# Patient Record
Sex: FEMALE | Race: WHITE | NOT HISPANIC OR LATINO | Employment: UNEMPLOYED | URBAN - NONMETROPOLITAN AREA
[De-identification: names, ages, dates, MRNs, and addresses within clinical notes are randomized per-mention and may not be internally consistent; named-entity substitution may affect disease eponyms.]

---

## 2018-07-02 ENCOUNTER — OFFICE VISIT (OUTPATIENT)
Dept: PULMONOLOGY | Facility: CLINIC | Age: 38
End: 2018-07-02

## 2018-07-02 VITALS
RESPIRATION RATE: 17 BRPM | BODY MASS INDEX: 32.15 KG/M2 | HEIGHT: 65 IN | DIASTOLIC BLOOD PRESSURE: 80 MMHG | WEIGHT: 193 LBS | HEART RATE: 90 BPM | OXYGEN SATURATION: 96 % | SYSTOLIC BLOOD PRESSURE: 120 MMHG

## 2018-07-02 DIAGNOSIS — J30.89 OTHER ALLERGIC RHINITIS: ICD-10-CM

## 2018-07-02 DIAGNOSIS — R06.02 SHORTNESS OF BREATH: Primary | ICD-10-CM

## 2018-07-02 DIAGNOSIS — J30.2 CHRONIC SEASONAL ALLERGIC RHINITIS, UNSPECIFIED TRIGGER: ICD-10-CM

## 2018-07-02 DIAGNOSIS — R06.02 SOB (SHORTNESS OF BREATH): Primary | ICD-10-CM

## 2018-07-02 DIAGNOSIS — R91.1 LUNG NODULE, SOLITARY: ICD-10-CM

## 2018-07-02 DIAGNOSIS — J45.40 MODERATE PERSISTENT ASTHMA WITHOUT COMPLICATION: ICD-10-CM

## 2018-07-02 PROCEDURE — 94060 EVALUATION OF WHEEZING: CPT | Performed by: INTERNAL MEDICINE

## 2018-07-02 PROCEDURE — 99244 OFF/OP CNSLTJ NEW/EST MOD 40: CPT | Performed by: INTERNAL MEDICINE

## 2018-07-02 PROCEDURE — 95012 NITRIC OXIDE EXP GAS DETER: CPT | Performed by: INTERNAL MEDICINE

## 2018-07-02 RX ORDER — MONTELUKAST SODIUM 10 MG/1
10 TABLET ORAL NIGHTLY
Qty: 30 TABLET | Refills: 5 | Status: SHIPPED | OUTPATIENT
Start: 2018-07-02 | End: 2019-09-09

## 2018-07-02 RX ORDER — ALBUTEROL SULFATE 2.5 MG/3ML
2.5 SOLUTION RESPIRATORY (INHALATION)
COMMUNITY
Start: 2018-05-28 | End: 2019-05-08

## 2018-07-02 RX ORDER — CETIRIZINE HYDROCHLORIDE 10 MG/1
10 TABLET ORAL DAILY
COMMUNITY

## 2018-07-02 RX ORDER — FLUTICASONE PROPIONATE 50 MCG
1 SPRAY, SUSPENSION (ML) NASAL DAILY
Qty: 1 BOTTLE | Refills: 5 | Status: SHIPPED | OUTPATIENT
Start: 2018-07-02 | End: 2020-11-19

## 2018-07-02 RX ORDER — ALBUTEROL SULFATE 90 UG/1
AEROSOL, METERED RESPIRATORY (INHALATION)
COMMUNITY
Start: 2018-05-28 | End: 2019-05-08

## 2018-07-02 NOTE — PROGRESS NOTES
CONSULT NOTE    Requested by:   Ryan Chaudhary DO Charles P. Shaw, DO      Chief Complaint   Patient presents with   • Consult     Abnormal CT    • Shortness of Breath       Subjective   Terra Hall is a 37 y.o. female.     History of Present Illness   Patient comes in today for consultation because of shortness of breath for the past few years.  She was also found to have a lung nodule on abdominal CT.    The patient says that her shortness of breath is more pronounced during her episodes with bronchitis.  She actually has had multiple admissions to the hospital for asthma dating back to 2015.  She moved to a new house in 2015 which did not have any significant carpet in it and her symptoms improved significantly.  Late last year however, she moved to another house where there is a lot of carpet and she has had 3 exacerbations and then requiring steroid administration.    The patient denies a history of smoking.    Patient also complains of runny nose and dribbling in the back of the throat for the past few months. This has been sometimes associated with seasonal variation.    She was given Qvar but she only uses once or twice a week.  She also uses albuterol once or twice a week.    She is complaining of occasional snoring and daytime sleepiness and tiredness.    The following portions of the patient's history were reviewed and updated as appropriate: allergies, current medications, past family history, past medical history, past social history and past surgical history.    Review of Systems   Constitutional: Positive for fatigue. Negative for chills and fever.   HENT: Positive for rhinorrhea and voice change. Negative for sinus pressure, sneezing and sore throat.    Respiratory: Positive for cough and shortness of breath. Negative for chest tightness and wheezing.    Cardiovascular: Negative for chest pain, palpitations and leg swelling.   Psychiatric/Behavioral: Negative for sleep disturbance.   All  "other systems reviewed and are negative.      Past Medical History:   Diagnosis Date   • Asthma    • Kidney stones    • Migraine    • Seasonal allergies        Social History   Substance Use Topics   • Smoking status: Never Smoker   • Smokeless tobacco: Never Used   • Alcohol use No         Objective   Visit Vitals  /80   Pulse 90   Resp 17   Ht 165.1 cm (65\")   Wt 87.5 kg (193 lb)   SpO2 96%   BMI 32.12 kg/m²       Physical Exam   Constitutional: She is oriented to person, place, and time. She appears well-developed and well-nourished.   HENT:   Head: Atraumatic.   Sinuses were non tender on palpation.  Nostril showed mild erythema.  Oropharynx was cobblestoned & moist.  Nasal turbinate hypertrophy noted.    Eyes: EOM are normal.   Neck: Neck supple. No JVD present. No thyromegaly present.   Cardiovascular: Normal rate and regular rhythm.    No murmur heard.  Pulmonary/Chest: Effort normal. No respiratory distress. She has no wheezes. She has no rales.   Musculoskeletal:   Gait was normal.   Neurological: She is alert and oriented to person, place, and time.   Skin: Skin is warm and dry.   Psychiatric: She has a normal mood and affect. Her behavior is normal.   Vitals reviewed.      Assessment/Plan   Terra was seen today for consult and shortness of breath.    Diagnoses and all orders for this visit:    Shortness of breath  -     Pulmonary Function Test  -     Peak Flow  -     Nitric Oxide    Moderate persistent asthma without complication  -     Pulmonary Function Test  -     Peak Flow  -     Nitric Oxide    Lung nodule, solitary    Other allergic rhinitis  -     IgE; Future  -     Allergens, Zone 8; Future    Chronic seasonal allergic rhinitis, unspecified trigger  -     fluticasone (FLONASE) 50 MCG/ACT nasal spray; 1 spray into each nostril Daily.    Other orders  -     montelukast (SINGULAIR) 10 MG tablet; Take 1 tablet by mouth Every Night.         Return in about 3 months (around 10/2/2018) for " Sabrina, For Misty.    DISCUSSION(if any):  I have also reviewed her urgent care office note that mentions acute pharyngitis, seasonal allergic rhinitis and administration of Solu-Medrol and azithromycin.     FeNO level was 13 today.    Spirometry was reviewed with the patient.  Essentially unremarkable although there was mild obstructive defect noted.    Her peak flow was 350 LPM.    ===========================  ===========================    I had a long conversation with the patient and told her that she seems to have mild to moderate persistent asthma, with seasonal worsening.    I have added Singulair.    If the patient has any further exacerbations, and she will definitely need long-term medications such as inhaled corticosteroids.    We will try to avoid long-acting beta agonist, given her history of palpitations with Symbicort in the recent past.    Patient will be started on nasal spray for symptoms which are definitely consistent with allergic rhinitis.     I have ordered IgE/RAST panel.    Although she does not have classic symptoms of sleep apnea, I have asked her to obtain more history about her snoring from her .  She may benefit from at least filling out a sleep questionnaire, at a later date and this can be discussed with her on her follow-up.    As far as the lung nodule is concerned, her repeat CT scan showed that the nodule has remained 4 mm and there was no further follow-up recommended, which I agree with, based on the latest guidelines.    Dictated utilizing Dragon dictation.    This document was electronically signed by Esperanza Palumbo MD July 2, 2018  11:33 AM

## 2018-08-22 ENCOUNTER — OFFICE VISIT (OUTPATIENT)
Dept: OBSTETRICS AND GYNECOLOGY | Facility: CLINIC | Age: 38
End: 2018-08-22

## 2018-08-22 VITALS
WEIGHT: 195 LBS | DIASTOLIC BLOOD PRESSURE: 88 MMHG | HEIGHT: 66 IN | SYSTOLIC BLOOD PRESSURE: 154 MMHG | BODY MASS INDEX: 31.34 KG/M2

## 2018-08-22 DIAGNOSIS — Z30.09 GENERAL COUNSELING AND ADVICE ON FEMALE CONTRACEPTION: ICD-10-CM

## 2018-08-22 DIAGNOSIS — N93.9 ABNORMAL UTERINE BLEEDING (AUB): ICD-10-CM

## 2018-08-22 DIAGNOSIS — Z97.5 CONTRACEPTION, DEVICE INTRAUTERINE: ICD-10-CM

## 2018-08-22 DIAGNOSIS — R10.2 PELVIC PAIN: Primary | ICD-10-CM

## 2018-08-22 DIAGNOSIS — N20.0 NEPHROLITHIASIS: ICD-10-CM

## 2018-08-22 PROCEDURE — 99204 OFFICE O/P NEW MOD 45 MIN: CPT | Performed by: OBSTETRICS & GYNECOLOGY

## 2018-08-22 RX ORDER — CEFDINIR 300 MG/1
CAPSULE ORAL
COMMUNITY
Start: 2018-08-20 | End: 2019-07-03

## 2018-08-22 RX ORDER — PROMETHAZINE HYDROCHLORIDE 25 MG/1
TABLET ORAL
COMMUNITY
Start: 2018-08-20 | End: 2020-11-19

## 2018-08-22 RX ORDER — TAMSULOSIN HYDROCHLORIDE 0.4 MG/1
CAPSULE ORAL
COMMUNITY
Start: 2018-08-20

## 2018-08-22 RX ORDER — HYDROCODONE BITARTRATE AND ACETAMINOPHEN 10; 325 MG/1; MG/1
TABLET ORAL
COMMUNITY
Start: 2018-08-20 | End: 2020-11-19

## 2018-08-22 RX ORDER — ONDANSETRON HYDROCHLORIDE 8 MG/1
TABLET, FILM COATED ORAL
COMMUNITY
Start: 2018-08-20

## 2018-08-22 NOTE — PROGRESS NOTES
"Subjective   Chief Complaint   Patient presents with   • Pelvic Pain     started having pain about a month ago, has been passing kidney stones, has Mirena but due to be removed next month, wants to discuss other birth control options     Terra Hall is a 37 y.o. year old  presenting to be seen for pelvic pain, concerns about IUD positioning and contraception counseling..    She reports long-standing issues with nephrolithiasis.  She was seen at a [facility] on  with worsening suprapubic, pelvic and flank pain.  Pain was worse on her left side radiated to her growing.  She was diagnosed there with a UTI and kidney stones on imaging.  She reports having passed a pea-sized stone since that time and her pain is improved somewhat, but is still present.  She saw her urologist following that visit.  He prescribed Flomax, antibiotics, analgesics and antiemetics.  She has had some improvement with this therapy.    She reports that her Mirena IUD was placed 5 years ago.  She denies issues with the device for the first 4 years.  This past year she reports that she has been concerned about its placement.  She can \"feel it at times\" and has heard about these devices migrating.  She is concerned that a malpositioned IUD could be contributory to her pelvic pain.  She was told in the emergency room that it was possible it had migrated.    She also reports an issue with ovarian cysts and says this was a factor in modal therapy over the years.  She wonders today if possibly ovarian pathology could be contributing to her pelvic pain.    She is entertaining the idea of having the device removed and undergoing tubal sterilization.  She would like to discuss this as an option for her moving forward.    She has a long-standing history of migraines with neurologic symptoms resembling seizure-like and/or stroke-like activity.  She thinks the IUD has helped with these as they have been much less frequent during " use.      She reports a long-standing history with abnormal uterine bleeding.  She reports heavy and painful menses.  She has used a variety of hormonal medication through the years.  She reports using oral birth control pills since the age of 14.  Her headaches prompted her doctors to switch to progesterone only therapies.  She did Depo shots for roughly 1 year but discontinued secondary to weight gain.  The IUD was placed at that time which again was roughly 5 years ago.  She has been very pleased with her bleeding on the Mirena IUD.    Her last Pap smear was September 2017 and was normal per her report.  She does recall that a LEEP procedure was done roughly 12 years ago.  She underwent a mammogram in 2017 which was normal per her report.  She denies ever having a DEXA scan or colonoscopy.    She denies nausea, emesis, fevers, chills, significant weight changes, hair/skin/nail changes, dysuria, urinary frequency, palpitations, myalgia, dyspnea.     History  Past Medical History:   Diagnosis Date   • Asthma    • Kidney stones    • Migraine    • Seasonal allergies    , Past Surgical History:   Procedure Laterality Date   • ADENOIDECTOMY     • CHOLECYSTECTOMY     • DIAGNOSTIC LAPAROSCOPY     • NEPHROSTOMY     • TONSILLECTOMY     , Family History   Problem Relation Age of Onset   • Asthma Mother    • Heart failure Mother    • Cancer Mother    • Osteoarthritis Mother    • Hypertension Mother    • Breast cancer Mother    • Lung disease Father    • Arthritis Father    • Hypertension Father    • Osteoporosis Paternal Grandmother    • Osteoporosis Maternal Grandmother    • Diabetes Maternal Grandmother    , Social History   Substance Use Topics   • Smoking status: Never Smoker   • Smokeless tobacco: Never Used   • Alcohol use No   ,   (Not in a hospital admission) and Allergies:  Bactrim [sulfamethoxazole-trimethoprim]; Sumatriptan; and Symbicort [budesonide-formoterol fumarate]    Smoking status: Never Smoker              "                                                 Smokeless tobacco: Never Used                          Review of Systems  Pertinent items are noted in HPI, all other systems reviewed and negative       Objective   /88   Ht 167.6 cm (66\")   Wt 88.5 kg (195 lb)   Breastfeeding? No   BMI 31.47 kg/m²     Physical Exam:  General Appearance: alert, pleasant, appears stated age, interactive and cooperative  Head: normocephalic, without obvious abnormality and atraumatic  Eyes: lids and lashes normal and no icterus  Ears: ears appear intact with no abnormalities noted  Nose: nares normal, septum midline, mucosa normal and no drainage  Neck: suppple, trachea midline and no thyromegaly  Back: no kyphosis present, no scoliosis present and range of motion normal  Lungs: respirations regular, respirations even and respirations unlabored  Abdomen: no masses, no hepatomegaly, no splenomegaly, soft non-tender, no guarding and no rebound tenderness  Extremities: moves extremities well, no edema, no cyanosis and no redness  Skin: no bleeding, bruising or rash and no lesions noted  Lymph Nodes: no palpable adenopathy  Neurologic: Cranial Nerves cranial nerves 2 - 12 grossly intact, Speech normal content and execusion, Coordination normal  Psych: normal mood and affect, oriented to person, time and place, thought content organized and appropriate judgment    Pelvis:  Pelvic: Clinical staff was present for exam  External genitalia:  normal appearance of the external genitalia including Bartholin's and Osino's glands.  :  urethral meatus normal;  Vagina:  normal pink mucosa without prolapse or lesions.  Cervix:  normal appearance. IUD string present - 1.5 cms in length;  Uterus:  normal size, shape and consistency. Non-tender on exam.  Adnexa:  normal bimanual exam of the adnexa.    Review of Labs:   BMP, CBC and UA    Review of Imaging:  KUB    Decision to obtain old records:  No.    Summarization of old records:  Urine " studies consistent with a UTI.  KUB showed left nephrolithiasis.  Labs otherwise reassuring.    Independent review of image, tracing or specimen:  Anteverted uterus normal in size and shape.  IUD appears to be in proper position with no concerns for intramural migration.  Bilateral ovaries demonstrated small follicles with normal vascular flow no adnexal masses.  See my formal read for additional details.    Assessment/Plan:  1.  Chronic pelvic pain  2.  Abnormal uterine bleeding  3.  IUD in proper position  4.  Migraines with neurologic sequelae  5.  Nephrolithiasis  6.  Encounter for general contraception counseling    We reviewed her exam and ultrasound findings in detail.  Her IUD is appropriately positioned and I do not feel that this device is contributory to her pain.  I believe all of her recent symptoms can be attributed to her nephrolithiasis.  Her ultrasound today also shows no ovarian pathology at this time.  We discussed contraception options taking into account her various medical comorbidities.  I would not recommend any estrogen containing methods given these comorbidities.  We reviewed progesterone only options including another IUD, Nexplanon, Depo-Provera injections. We also discussed surgical sterilization.  I think that for her, surgical sterilization would only address 1 pertinent issue.  It would certainly give her adequate birth control, however it would not address her pain or bleeding issues.  It is likely she would return to her baseline function in these areas and might actually require medical therapy to suppress these anyway.  I think she has done well with her IUD for 5 years now and this would be her best option.  She is happy with the bleeding profile and believes it to be helpful with her migraines.  She has also experienced less cyclical pelvic pain related to her cycle and has not struggled with ovarian pathology.  I recommend a new IUD be placed at her next visit in place of the  old IUD.  Orders were placed for this therapy and she can pick it up at the pharmacy when it is ready.    Follow up for IUD removal/insertion.    Festus Rowley MD  Obstetrics and Gynecology  [facility]

## 2018-09-27 ENCOUNTER — LAB (OUTPATIENT)
Dept: LAB | Facility: HOSPITAL | Age: 38
End: 2018-09-27
Attending: INTERNAL MEDICINE

## 2018-09-27 DIAGNOSIS — J30.89 OTHER ALLERGIC RHINITIS: ICD-10-CM

## 2018-09-27 PROCEDURE — 86003 ALLG SPEC IGE CRUDE XTRC EA: CPT

## 2018-09-27 PROCEDURE — 82785 ASSAY OF IGE: CPT

## 2018-09-27 PROCEDURE — 36415 COLL VENOUS BLD VENIPUNCTURE: CPT

## 2018-10-01 LAB
A ALTERNATA IGE QN: <0.1 KU/L
A FUMIGATUS IGE QN: <0.1 KU/L
AMER ROACH IGE QN: <0.1 KU/L
BAHIA GRASS IGE QN: <0.1 KU/L
BERMUDA GRASS IGE QN: <0.1 KU/L
BOXELDER IGE QN: <0.1 KU/L
C HERBARUM IGE QN: <0.1 KU/L
CAT DANDER IGG QN: <0.1 KU/L
CMN PIGWEED IGE QN: <0.1 KU/L
COMMON RAGWEED IGE QN: <0.1 KU/L
CONV CLASS DESCRIPTION: ABNORMAL
D FARINAE IGE QN: 2.59 KU/L
D PTERONYSS IGE QN: 2.5 KU/L
DOG DANDER IGE QN: <0.1 KU/L
ENGL PLANTAIN IGE QN: <0.1 KU/L
HAZELNUT POLN IGE QN: <0.1 KU/L
JOHNSON GRASS IGE QN: <0.1 KU/L
KENT BLUE GRASS IGE QN: <0.1 KU/L
M RACEMOSUS IGE QN: <0.1 KU/L
MT JUNIPER IGE QN: <0.1 KU/L
MUGWORT IGE QN: <0.1 KU/L
NETTLE IGE QN: <0.1 KU/L
P NOTATUM IGE QN: <0.1 KU/L
S BOTRYOSUM IGE QN: <0.1 KU/L
SHEEP SORREL IGE QN: <0.1 KU/L
SWEET GUM IGE QN: <0.1 KU/L
T011-IGE MAPLE LEAF SYCAMORE: <0.1 KU/L
TOTAL IGE SMQN RAST: 16 IU/ML (ref 0–100)
WHITE ELM IGE QN: <0.1 KU/L
WHITE HICKORY IGE QN: <0.1 KU/L
WHITE MULBERRY IGE QN: <0.1 KU/L
WHITE OAK IGE QN: <0.1 KU/L

## 2018-10-02 ENCOUNTER — OFFICE VISIT (OUTPATIENT)
Dept: PULMONOLOGY | Facility: CLINIC | Age: 38
End: 2018-10-02

## 2018-10-02 VITALS
DIASTOLIC BLOOD PRESSURE: 74 MMHG | OXYGEN SATURATION: 97 % | HEIGHT: 66 IN | BODY MASS INDEX: 31.66 KG/M2 | SYSTOLIC BLOOD PRESSURE: 122 MMHG | WEIGHT: 197 LBS | HEART RATE: 87 BPM | RESPIRATION RATE: 16 BRPM

## 2018-10-02 DIAGNOSIS — J45.40 MODERATE PERSISTENT ASTHMA WITHOUT COMPLICATION: ICD-10-CM

## 2018-10-02 DIAGNOSIS — J30.89 OTHER ALLERGIC RHINITIS: ICD-10-CM

## 2018-10-02 DIAGNOSIS — R06.02 SOB (SHORTNESS OF BREATH): Primary | ICD-10-CM

## 2018-10-02 PROCEDURE — 99214 OFFICE O/P EST MOD 30 MIN: CPT | Performed by: NURSE PRACTITIONER

## 2018-10-02 RX ORDER — SPIRONOLACTONE 25 MG/1
25 TABLET ORAL DAILY
COMMUNITY
End: 2020-11-19

## 2018-10-02 RX ORDER — DIVALPROEX SODIUM 500 MG/1
500 TABLET, DELAYED RELEASE ORAL 3 TIMES DAILY
COMMUNITY
End: 2020-11-19

## 2018-10-02 NOTE — PROGRESS NOTES
"Chief Complaint   Patient presents with   • Follow-up   • Shortness of Breath         Subjective   Terra Hall is a 38 y.o. female.     History of Present Illness   The patient comes in today complaining of shortness of breath and allergic rhinitis.    She states she's been doing well without medication however she just moved into a house which has carpeting and she has been having issues since moving into the house.  She previously lived in a house with hardwood idalia and no carpet.  She feels she is having some allergy issues.    She was on allergy injections for about 7 years until she lost her insurance.    She is using Flonase on a regular basis and taking Singulair at night.  Her breathing has been better since she began using these medications.    Over the past week she complains of having a nonproductive cough which is been intermittent.  She does feel the Flonase helps greatly    She has used her rescue inhaler 3 times since her last visit but is not needing it on an every day basis.    She was tried on Advair and Symbicort in the past and she states that she Bronchitis while on these medications.  However she has used Qvar in the past and tolerated this medication well and it did seem to help.    The following portions of the patient's history were reviewed and updated as appropriate: allergies, current medications, past family history, past medical history, past social history and past surgical history.    Review of Systems   HENT: Negative for sinus pressure, sneezing and sore throat.    Respiratory: Positive for cough and wheezing. Negative for shortness of breath.        Objective   Visit Vitals  /74   Pulse 87   Resp 16   Ht 167.6 cm (65.98\")   Wt 89.4 kg (197 lb)   SpO2 97%   BMI 31.81 kg/m²     Physical Exam   Constitutional: She is oriented to person, place, and time. She appears well-developed and well-nourished.   HENT:   Head: Atraumatic.   Crowded oropharynx.    Eyes: EOM are " normal.   Neck: Neck supple.   Cardiovascular: Normal rate and regular rhythm.    Pulmonary/Chest: Effort normal. No respiratory distress.   Somewhat decreased A/E without wheezing noted.   Musculoskeletal: She exhibits no edema.   Gait normal.   Neurological: She is alert and oriented to person, place, and time.   Skin: Skin is warm and dry.   Psychiatric: She has a normal mood and affect.   Vitals reviewed.          Assessment/Plan   Terra was seen today for follow-up and shortness of breath.    Diagnoses and all orders for this visit:    SOB (shortness of breath)    Moderate persistent asthma without complication    Other allergic rhinitis    Other orders  -     beclomethasone (QVAR) 40 MCG/ACT inhaler; Inhale 2 puffs 2 (Two) Times a Day.           Return in about 16 weeks (around 1/22/2019) for Recheck, For Dr. Palumbo.    DISCUSSION (if any):  I reviewed her labs.  RAST is positive.  IgE 16.  Absolute eosinophils are normal.    It does seem that allergy type symptoms and exposure to increased allergens such as moving into a home with carpet has increased asthma symptoms.  She does note that her breathing has improved since she began using Flonase and Singulair again however she does seem to be having some uncontrolled asthma due to the aforementioned reasons.  Therefore I've asked her to start Qvar and to use the medication twice a day.  She has done well with this medication in the past so I do not expect her to have any issues.  I have also asked her to continue taking Singulair and using Flonase on a regular basis.  She does have a rescue inhaler to use for increased shortness of breath and wheezing.      Dictated utilizing Dragon dictation.    This document was electronically signed by JUAN CARLOS Berger October 12, 2018  2:19 PM

## 2018-10-04 ENCOUNTER — OFFICE VISIT (OUTPATIENT)
Dept: OBSTETRICS AND GYNECOLOGY | Facility: CLINIC | Age: 38
End: 2018-10-04

## 2018-10-04 VITALS
SYSTOLIC BLOOD PRESSURE: 124 MMHG | HEIGHT: 66 IN | WEIGHT: 198 LBS | DIASTOLIC BLOOD PRESSURE: 72 MMHG | BODY MASS INDEX: 31.82 KG/M2

## 2018-10-04 DIAGNOSIS — Z30.433 ENCOUNTER FOR REMOVAL AND REINSERTION OF INTRAUTERINE CONTRACEPTIVE DEVICE (IUD): Primary | ICD-10-CM

## 2018-10-04 LAB
B-HCG UR QL: NEGATIVE
INTERNAL NEGATIVE CONTROL: NEGATIVE
INTERNAL POSITIVE CONTROL: POSITIVE
Lab: NORMAL

## 2018-10-04 PROCEDURE — 58300 INSERT INTRAUTERINE DEVICE: CPT | Performed by: OBSTETRICS & GYNECOLOGY

## 2018-10-04 PROCEDURE — 58301 REMOVE INTRAUTERINE DEVICE: CPT | Performed by: OBSTETRICS & GYNECOLOGY

## 2018-10-04 PROCEDURE — 81025 URINE PREGNANCY TEST: CPT | Performed by: OBSTETRICS & GYNECOLOGY

## 2018-10-04 RX ORDER — BECLOMETHASONE DIPROPIONATE HFA 40 UG/1
AEROSOL, METERED RESPIRATORY (INHALATION)
COMMUNITY
Start: 2018-10-02 | End: 2019-09-09 | Stop reason: SDUPTHER

## 2018-10-04 RX ORDER — IBUPROFEN 800 MG/1
800 TABLET ORAL EVERY 6 HOURS PRN
Status: DISCONTINUED | OUTPATIENT
Start: 2018-10-04 | End: 2020-11-19

## 2018-10-04 RX ADMIN — IBUPROFEN 800 MG: 800 TABLET ORAL at 12:08

## 2018-10-04 NOTE — PROGRESS NOTES
IUD Removal and Immediate Reinsertion    No LMP recorded. Patient has had an implant.    Date of procedure:  10/4/2018    Risks and benefits discussed? yes  All questions answered? yes  Consents given by the patient  Written consent obtained? yes  Reason for removal: Device expiration    Local anesthesia used:  no    Procedure documentation:    A speculum was placed in order to view the cervix.  A tenaculum did need to be placed on the anterior cervical lip.  Cervical dilation did not need to be performed in order to access the string.  The IUD string was easily seen.  The string was grasped and the IUD was removed without difficulty.  The IUD did not appear to be adherent to the uterine cavity. It was removed intact.    Tthe cervix was cleansed with an antiseptic solution.  Vaginal discharge was scant. The uterine cavity was gently sounded.  There was no difficulty passing the sound through the cervix.  Cervical dilation did not need to be performed prior to placing the IUD.  The uterus was anteverted and sounded to 7.5 cms.  The Mirena was then prepared per the manufacturers instructions.    The Mirena was advanced to a point 2 cms from the fundus and then the arms were released from the sheath.  The device was advanced to the fundus and the device was released fully from the sheath.. The string was cut 3 cms in length.  Bleeding from the cervix was scant.    She tolerated the procedure without any difficulty.     Post procedure instructions: It was reviewed that the Mirena will not alter the timing of when she bleeds but it may decrease the quantity of flow and cramps.  Roughly 30% of people will be amenorrheic over time.  Efficacy rate of 99.2% over 5 years was discussed.  Spontaneous expulsion rate of 1-2% was also discussed.  If she has any issue with fever or excessive bleeding or pain she is to call the office immediately.  Otherwise I would like to see her back in 5 weeks for f/u appt.    Festus Rowley,  MD  Obstetrics and Gynecology  [facility]

## 2018-10-29 ENCOUNTER — APPOINTMENT (OUTPATIENT)
Dept: CT IMAGING | Facility: HOSPITAL | Age: 38
End: 2018-10-29

## 2018-10-29 ENCOUNTER — HOSPITAL ENCOUNTER (EMERGENCY)
Facility: HOSPITAL | Age: 38
Discharge: HOME | End: 2018-10-29
Attending: EMERGENCY MEDICINE | Admitting: EMERGENCY MEDICINE

## 2018-10-29 VITALS
HEIGHT: 66 IN | HEART RATE: 80 BPM | RESPIRATION RATE: 21 BRPM | SYSTOLIC BLOOD PRESSURE: 134 MMHG | DIASTOLIC BLOOD PRESSURE: 99 MMHG | BODY MASS INDEX: 31.53 KG/M2 | WEIGHT: 196.21 LBS | OXYGEN SATURATION: 99 % | TEMPERATURE: 97.8 F

## 2018-10-29 DIAGNOSIS — R09.89 GLOBUS SENSATION: Primary | ICD-10-CM

## 2018-10-29 LAB
ALBUMIN SERPL-MCNC: 4.4 G/DL (ref 3.5–5)
ALBUMIN/GLOB SERPL: 1.3 G/DL (ref 1–2)
ALP SERPL-CCNC: 66 U/L (ref 38–126)
ALT SERPL W P-5'-P-CCNC: 34 U/L (ref 13–69)
ANION GAP SERPL CALCULATED.3IONS-SCNC: 10.8 MMOL/L (ref 10–20)
AST SERPL-CCNC: 29 U/L (ref 15–46)
BASOPHILS # BLD AUTO: 0.04 10*3/MM3 (ref 0–0.2)
BASOPHILS NFR BLD AUTO: 0.2 % (ref 0–2.5)
BILIRUB SERPL-MCNC: 0.3 MG/DL (ref 0.2–1.3)
BUN BLD-MCNC: 16 MG/DL (ref 7–20)
BUN/CREAT SERPL: 26.7 (ref 7.1–23.5)
CALCIUM SPEC-SCNC: 9.4 MG/DL (ref 8.4–10.2)
CHLORIDE SERPL-SCNC: 104 MMOL/L (ref 98–107)
CO2 SERPL-SCNC: 27 MMOL/L (ref 26–30)
CREAT BLD-MCNC: 0.6 MG/DL (ref 0.6–1.3)
DEPRECATED RDW RBC AUTO: 41.5 FL (ref 37–54)
EOSINOPHIL # BLD AUTO: 0 10*3/MM3 (ref 0–0.7)
EOSINOPHIL NFR BLD AUTO: 0 % (ref 0–7)
ERYTHROCYTE [DISTWIDTH] IN BLOOD BY AUTOMATED COUNT: 13.5 % (ref 11.5–14.5)
GFR SERPL CREATININE-BSD FRML MDRD: 113 ML/MIN/1.73
GLOBULIN UR ELPH-MCNC: 3.5 GM/DL
GLUCOSE BLD-MCNC: 124 MG/DL (ref 74–98)
HCT VFR BLD AUTO: 40.7 % (ref 37–47)
HGB BLD-MCNC: 13 G/DL (ref 12–16)
IMM GRANULOCYTES # BLD: 0.24 10*3/MM3 (ref 0–0.06)
IMM GRANULOCYTES NFR BLD: 1.4 % (ref 0–0.6)
LYMPHOCYTES # BLD AUTO: 2.24 10*3/MM3 (ref 0.6–3.4)
LYMPHOCYTES NFR BLD AUTO: 13.1 % (ref 10–50)
MCH RBC QN AUTO: 26.9 PG (ref 27–31)
MCHC RBC AUTO-ENTMCNC: 31.9 G/DL (ref 30–37)
MCV RBC AUTO: 84.1 FL (ref 81–99)
MONOCYTES # BLD AUTO: 1.47 10*3/MM3 (ref 0–0.9)
MONOCYTES NFR BLD AUTO: 8.6 % (ref 0–12)
NEUTROPHILS # BLD AUTO: 13.07 10*3/MM3 (ref 2–6.9)
NEUTROPHILS NFR BLD AUTO: 76.7 % (ref 37–80)
NRBC BLD MANUAL-RTO: 0 /100 WBC (ref 0–0)
PLATELET # BLD AUTO: 285 10*3/MM3 (ref 130–400)
PMV BLD AUTO: 10.8 FL (ref 6–12)
POTASSIUM BLD-SCNC: 3.8 MMOL/L (ref 3.5–5.1)
PROT SERPL-MCNC: 7.9 G/DL (ref 6.3–8.2)
RBC # BLD AUTO: 4.84 10*6/MM3 (ref 4.2–5.4)
S PYO AG THROAT QL: NEGATIVE
SODIUM BLD-SCNC: 138 MMOL/L (ref 137–145)
WBC NRBC COR # BLD: 17.06 10*3/MM3 (ref 4.8–10.8)

## 2018-10-29 PROCEDURE — 93005 ELECTROCARDIOGRAM TRACING: CPT | Performed by: EMERGENCY MEDICINE

## 2018-10-29 PROCEDURE — 25010000002 DIPHENHYDRAMINE PER 50 MG: Performed by: NURSE PRACTITIONER

## 2018-10-29 PROCEDURE — 99283 EMERGENCY DEPT VISIT LOW MDM: CPT

## 2018-10-29 PROCEDURE — 70491 CT SOFT TISSUE NECK W/DYE: CPT

## 2018-10-29 PROCEDURE — 87081 CULTURE SCREEN ONLY: CPT | Performed by: NURSE PRACTITIONER

## 2018-10-29 PROCEDURE — 80053 COMPREHEN METABOLIC PANEL: CPT | Performed by: NURSE PRACTITIONER

## 2018-10-29 PROCEDURE — 96374 THER/PROPH/DIAG INJ IV PUSH: CPT

## 2018-10-29 PROCEDURE — 25010000002 IOPAMIDOL 61 % SOLUTION: Performed by: EMERGENCY MEDICINE

## 2018-10-29 PROCEDURE — 85025 COMPLETE CBC W/AUTO DIFF WBC: CPT | Performed by: NURSE PRACTITIONER

## 2018-10-29 PROCEDURE — 87880 STREP A ASSAY W/OPTIC: CPT | Performed by: NURSE PRACTITIONER

## 2018-10-29 RX ORDER — SODIUM CHLORIDE 0.9 % (FLUSH) 0.9 %
10 SYRINGE (ML) INJECTION AS NEEDED
Status: DISCONTINUED | OUTPATIENT
Start: 2018-10-29 | End: 2018-10-29 | Stop reason: HOSPADM

## 2018-10-29 RX ORDER — DIPHENHYDRAMINE HYDROCHLORIDE 50 MG/ML
25 INJECTION INTRAMUSCULAR; INTRAVENOUS ONCE
Status: COMPLETED | OUTPATIENT
Start: 2018-10-29 | End: 2018-10-29

## 2018-10-29 RX ADMIN — IOPAMIDOL 100 ML: 612 INJECTION, SOLUTION INTRAVENOUS at 18:40

## 2018-10-29 RX ADMIN — DIPHENHYDRAMINE HYDROCHLORIDE 25 MG: 50 INJECTION, SOLUTION INTRAMUSCULAR; INTRAVENOUS at 17:41

## 2018-10-29 RX ADMIN — SODIUM CHLORIDE 1000 ML: 9 INJECTION, SOLUTION INTRAVENOUS at 17:41

## 2018-10-31 LAB — BACTERIA SPEC AEROBE CULT: NORMAL

## 2018-11-08 ENCOUNTER — OFFICE VISIT (OUTPATIENT)
Dept: OBSTETRICS AND GYNECOLOGY | Facility: CLINIC | Age: 38
End: 2018-11-08

## 2018-11-08 VITALS
BODY MASS INDEX: 32.3 KG/M2 | HEIGHT: 66 IN | SYSTOLIC BLOOD PRESSURE: 132 MMHG | WEIGHT: 201 LBS | DIASTOLIC BLOOD PRESSURE: 70 MMHG

## 2018-11-08 DIAGNOSIS — Z97.5 IUD (INTRAUTERINE DEVICE) IN PLACE: Primary | ICD-10-CM

## 2018-11-08 PROCEDURE — 99212 OFFICE O/P EST SF 10 MIN: CPT | Performed by: OBSTETRICS & GYNECOLOGY

## 2018-11-08 NOTE — PROGRESS NOTES
Subjective   Chief Complaint   Patient presents with   • Contraception     IUD check, Mirena inserted 10/4/2018, pt would like to discss an allergic reaction possible realted to IUD     Terra Hall is a 38 y.o. year old  presenting to be seen for IUD string check.    She reports that a couple weeks following IUD insertion, she experienced an anaphylactic type reaction. Workup was unrevealing. She wonders if it was related to the IUD.    She otherwise has no issues.  She had some bleeding for a couple days following insertion but has again reached an amenorrheic state.  She denies pain symptoms.    She denies nausea, emesis, fevers, chills, significant weight changes, hair/skin/nail changes, dysuria, urinary frequency, palpitations, chest pain, headaches, myalgia, dyspnea.     History  Past Medical History:   Diagnosis Date   • Asthma    • Kidney stones    • Migraine    • Seasonal allergies    , Past Surgical History:   Procedure Laterality Date   • ADENOIDECTOMY     • CHOLECYSTECTOMY     • DIAGNOSTIC LAPAROSCOPY     • NEPHROSTOMY     • TONSILLECTOMY     , Family History   Problem Relation Age of Onset   • Asthma Mother    • Heart failure Mother    • Cancer Mother    • Osteoarthritis Mother    • Hypertension Mother    • Breast cancer Mother    • Lung disease Father    • Arthritis Father    • Hypertension Father    • Osteoporosis Paternal Grandmother    • Osteoporosis Maternal Grandmother    • Diabetes Maternal Grandmother    , Social History   Substance Use Topics   • Smoking status: Never Smoker   • Smokeless tobacco: Never Used   • Alcohol use No   ,   (Not in a hospital admission) and Allergies:  Bactrim [sulfamethoxazole-trimethoprim]; Divalproex sodium [valproic acid]; Myrbetriq [mirabegron]; Spironolactone; Sumatriptan; and Symbicort [budesonide-formoterol fumarate]    Current Outpatient Prescriptions on File Prior to Visit   Medication Sig Dispense Refill   • albuterol (PROVENTIL) (2.5 MG/3ML)  "0.083% nebulizer solution Take 2.5 mg by nebulization 4 (Four) Times a Day.     • beclomethasone (QVAR) 40 MCG/ACT inhaler Inhale 2 puffs 2 (Two) Times a Day. 8.7 g 5   • cefdinir (OMNICEF) 300 MG capsule      • cetirizine (zyrTEC) 10 MG tablet Take 10 mg by mouth Daily.     • divalproex (DEPAKOTE) 500 MG DR tablet Take 500 mg by mouth 3 (Three) Times a Day.     • fluticasone (FLONASE) 50 MCG/ACT nasal spray 1 spray into each nostril Daily. 1 bottle 5   • HYDROcodone-acetaminophen (NORCO)  MG per tablet      • levonorgestrel (MIRENA, 52 MG,) 20 MCG/24HR IUD Take to Doctor's office as directed 1 each 0   • magnesium gluconate (MAGONATE) 500 MG tablet Take 500 mg by mouth Daily.     • Mirabegron ER (MYRBETRIQ) 25 MG tablet sustained-release 24 hour 24 hr tablet Take 25 mg by mouth Daily.     • montelukast (SINGULAIR) 10 MG tablet Take 1 tablet by mouth Every Night. 30 tablet 5   • ondansetron (ZOFRAN) 8 MG tablet      • Potassium (POTASSIMIN PO) Take  by mouth.     • promethazine (PHENERGAN) 25 MG tablet      • QVAR REDIHALER 40 MCG/ACT inhaler      • spironolactone (ALDACTONE) 25 MG tablet Take 25 mg by mouth Daily.     • tamsulosin (FLOMAX) 0.4 MG capsule 24 hr capsule      • VENTOLIN  (90 Base) MCG/ACT inhaler        Current Facility-Administered Medications on File Prior to Visit   Medication Dose Route Frequency Provider Last Rate Last Dose   • ibuprofen (ADVIL,MOTRIN) tablet 800 mg  800 mg Oral Q6H PRN Festus Rowley MD   800 mg at 10/04/18 1208       Smoking status: Never Smoker                                                              Smokeless tobacco: Never Used                          Review of Systems  Pertinent items are noted in HPI, all other systems were reviewed and negative       Objective   /70   Ht 167.6 cm (66\")   Wt 91.2 kg (201 lb)   BMI 32.44 kg/m²     Physical Exam:  General Appearance: alert, pleasant, appears stated age, interactive and cooperative  Head: " normocephalic, without obvious abnormality and atraumatic  Eyes: lids and lashes normal and no icterus  Ears: ears appear intact with no abnormalities noted  Nose: nares normal, septum midline, mucosa normal and no drainage  Neck: suppple, trachea midline and no thyromegaly  Back: no kyphosis present, no scoliosis present and range of motion normal  Lungs: respirations regular, respirations even and respirations unlabored, clear to auscultation bilaterally   Heart: regular rhythm and normal rate, normal S1, S2, no murmur, gallop, or rubs and no click  Breasts: Not performed.  Abdomen: no masses, no hepatomegaly, no splenomegaly, soft non-tender, no guarding and no rebound tenderness  Extremities: moves extremities well, no edema, no cyanosis and no redness  Skin: no bleeding, bruising or rash and no lesions noted  Lymph Nodes: no palpable adenopathy  Neurologic: Cranial Nerves cranial nerves 2 - 12 grossly intact, Speech normal content and execusion, Coordination normal  Psych: normal mood and affect, oriented to person, time and place, thought content organized and appropriate judgment    Pelvis:  Pelvic: Clinical staff was present for exam  External genitalia:  normal appearance of the external genitalia including Bartholin's and Herington's glands.  :  urethral meatus normal;  Vagina:  normal pink mucosa without prolapse or lesions.  Cervix:  normal appearance. IUD strings present at 3 cm.  Rectal:  digital rectal exam not performed; anus visually normal appearing.    Review of Labs:   No data reviewed    Review of Imaging:  No data reviewed    Decision to obtain old records:  No.    Summarization of old records:  N/A    Independent review of image, tracing or specimen:  N/A       Assessment   1.  IUD in appropriate position     Plan    No orders of the defined types were placed in this encounter.    Medications ordered: none    We reviewed her symptoms in detail today.  I do not feel the IUD was the cause for her  recent symptoms.  She previously had the Mirena in for 5 years and I simply placed a new device.    Follow up in 1 year for annual exam    Festus Rowley MD  Obstetrics and Gynecology  [facility]

## 2019-01-16 ENCOUNTER — TRANSCRIBE ORDERS (OUTPATIENT)
Dept: ADMINISTRATIVE | Facility: HOSPITAL | Age: 39
End: 2019-01-16

## 2019-01-16 DIAGNOSIS — Z12.31 VISIT FOR SCREENING MAMMOGRAM: Primary | ICD-10-CM

## 2019-01-17 ENCOUNTER — TRANSCRIBE ORDERS (OUTPATIENT)
Dept: ADMINISTRATIVE | Facility: HOSPITAL | Age: 39
End: 2019-01-17

## 2019-01-31 ENCOUNTER — OFFICE VISIT (OUTPATIENT)
Dept: PULMONOLOGY | Facility: CLINIC | Age: 39
End: 2019-01-31

## 2019-01-31 VITALS
SYSTOLIC BLOOD PRESSURE: 122 MMHG | DIASTOLIC BLOOD PRESSURE: 70 MMHG | WEIGHT: 200 LBS | HEART RATE: 89 BPM | RESPIRATION RATE: 16 BRPM | HEIGHT: 66 IN | BODY MASS INDEX: 32.14 KG/M2 | OXYGEN SATURATION: 98 %

## 2019-01-31 DIAGNOSIS — J45.20 MILD INTERMITTENT ASTHMA WITHOUT COMPLICATION: Primary | ICD-10-CM

## 2019-01-31 PROCEDURE — 99213 OFFICE O/P EST LOW 20 MIN: CPT | Performed by: INTERNAL MEDICINE

## 2019-01-31 NOTE — PROGRESS NOTES
"Chief Complaint   Patient presents with   • Follow-up   • Shortness of Breath         Subjective   Terra Hall is a 38 y.o. female.     History of Present Illness   Patient comes in today for follow up of asthma, and shortness of breath. Patient does not report any recent exacerbations requiring emergency room visits or hospitalizations.    Patient is using the rescue inhalers minimally.     She has been off QVAR for 1 month and has not had any symptoms.     She actually developed allergic reaction to some sort of medication and has been following closely with ENT and her primary care provider.    The following portions of the patient's history were reviewed and updated as appropriate: allergies, current medications, past family history, past medical history, past social history and past surgical history.    Review of Systems   HENT: Negative for sinus pressure, sneezing and sore throat.    Respiratory: Positive for shortness of breath. Negative for cough and wheezing.        Objective   Visit Vitals  /70   Pulse 89   Resp 16   Ht 167.6 cm (65.98\")   Wt 90.7 kg (200 lb)   SpO2 98%   BMI 32.30 kg/m²       Physical Exam   Constitutional: She is oriented to person, place, and time. She appears well-developed and well-nourished.   Eyes: EOM are normal.   Neck: Neck supple. No JVD present.   Cardiovascular: Normal rate and regular rhythm.   Pulmonary/Chest: Effort normal and breath sounds normal.   Musculoskeletal:   Gait was normal.   Neurological: She is alert and oriented to person, place, and time.   Skin: Skin is warm and dry.   Vitals reviewed.      Assessment/Plan   Terra was seen today for follow-up and shortness of breath.    Diagnoses and all orders for this visit:    Mild intermittent asthma without complication         Return in about 14 weeks (around 5/9/2019) for Recheck, For Misty, To be seen in [place].    DISCUSSION (if any):  Patient's symptoms seem to be under good control without any " long-term inhalers.    I have not started her back on QVAR.     She likely has seasonal asthma and we will evaluate her close to Spring and if necessary Qvar can be restarted.    Side effects of prescribed medications discussed with the patient.    I have discussed asthma action plan with her.    The patient was asked to call this office if the symptoms worsen.      Dictated utilizing Dragon dictation.    This document was electronically signed by Esperanza Palumbo MD January 31, 2019  3:42 PM

## 2019-02-27 ENCOUNTER — HOSPITAL ENCOUNTER (OUTPATIENT)
Dept: MAMMOGRAPHY | Facility: HOSPITAL | Age: 39
Discharge: HOME | End: 2019-02-27

## 2019-02-27 ENCOUNTER — APPOINTMENT (OUTPATIENT)
Dept: OTHER | Facility: HOSPITAL | Age: 39
End: 2019-02-27

## 2019-02-27 DIAGNOSIS — Z92.89 H/O MAMMOGRAM: ICD-10-CM

## 2019-02-27 DIAGNOSIS — Z12.31 VISIT FOR SCREENING MAMMOGRAM: ICD-10-CM

## 2019-02-28 ENCOUNTER — TRANSCRIBE ORDERS (OUTPATIENT)
Dept: ADMINISTRATIVE | Facility: HOSPITAL | Age: 39
End: 2019-02-28

## 2019-02-28 DIAGNOSIS — R22.32 AXILLARY MASS, LEFT: ICD-10-CM

## 2019-02-28 DIAGNOSIS — D24.9: Primary | ICD-10-CM

## 2019-03-06 ENCOUNTER — HOSPITAL ENCOUNTER (OUTPATIENT)
Dept: MAMMOGRAPHY | Facility: HOSPITAL | Age: 39
Discharge: HOME | End: 2019-03-06
Admitting: FAMILY MEDICINE

## 2019-03-06 ENCOUNTER — HOSPITAL ENCOUNTER (OUTPATIENT)
Dept: ULTRASOUND IMAGING | Facility: HOSPITAL | Age: 39
Discharge: HOME | End: 2019-03-06

## 2019-03-06 DIAGNOSIS — D24.9: ICD-10-CM

## 2019-03-06 DIAGNOSIS — R22.32 AXILLARY MASS, LEFT: ICD-10-CM

## 2019-03-06 PROCEDURE — 77066 DX MAMMO INCL CAD BI: CPT

## 2019-03-06 PROCEDURE — 77062 BREAST TOMOSYNTHESIS BI: CPT | Performed by: RADIOLOGY

## 2019-03-06 PROCEDURE — 76642 ULTRASOUND BREAST LIMITED: CPT | Performed by: RADIOLOGY

## 2019-03-06 PROCEDURE — 77066 DX MAMMO INCL CAD BI: CPT | Performed by: RADIOLOGY

## 2019-03-06 PROCEDURE — G0279 TOMOSYNTHESIS, MAMMO: HCPCS

## 2019-03-06 PROCEDURE — 76642 ULTRASOUND BREAST LIMITED: CPT

## 2019-05-08 ENCOUNTER — OFFICE VISIT (OUTPATIENT)
Dept: PULMONOLOGY | Facility: CLINIC | Age: 39
End: 2019-05-08

## 2019-05-08 VITALS
OXYGEN SATURATION: 98 % | BODY MASS INDEX: 31.82 KG/M2 | SYSTOLIC BLOOD PRESSURE: 126 MMHG | HEIGHT: 66 IN | DIASTOLIC BLOOD PRESSURE: 82 MMHG | RESPIRATION RATE: 16 BRPM | WEIGHT: 198 LBS | HEART RATE: 88 BPM

## 2019-05-08 DIAGNOSIS — J30.89 OTHER ALLERGIC RHINITIS: ICD-10-CM

## 2019-05-08 DIAGNOSIS — J45.40 MODERATE PERSISTENT ASTHMA WITHOUT COMPLICATION: ICD-10-CM

## 2019-05-08 DIAGNOSIS — R06.02 SOB (SHORTNESS OF BREATH): Primary | ICD-10-CM

## 2019-05-08 PROCEDURE — 99214 OFFICE O/P EST MOD 30 MIN: CPT | Performed by: NURSE PRACTITIONER

## 2019-05-08 RX ORDER — ALBUTEROL SULFATE 90 UG/1
2 AEROSOL, METERED RESPIRATORY (INHALATION) EVERY 6 HOURS PRN
Qty: 1 INHALER | Refills: 3 | Status: SHIPPED | OUTPATIENT
Start: 2019-05-08 | End: 2020-06-29 | Stop reason: SDUPTHER

## 2019-05-08 NOTE — PROGRESS NOTES
"Chief Complaint   Patient presents with   • Follow-up   • Shortness of Breath         Subjective   Terra Hall is a 38 y.o. female.     History of Present Illness   The patient comes in today for follow-up of asthma and allergic rhinitis.    She states she is only had to use the rescue inhaler twice since her last visit.  She states that when the ragweed began blooming she was a little more short of breath and use the rescue inhaler then.    She also states that she had an allergic reaction to something in October and was seen at the emergency room and at that time stopped all of her medications.  She has slowly added back medications one at a time.    She does not have an issue using albuterol but has not restarted Qvar or Singulair.    The following portions of the patient's history were reviewed and updated as appropriate: allergies, current medications, past family history, past medical history, past social history and past surgical history.    Review of Systems   HENT: Positive for sinus pressure. Negative for sneezing and sore throat.    Respiratory: Negative for cough, shortness of breath and wheezing.        Objective   Visit Vitals  /82   Pulse 88   Resp 16   Ht 167.6 cm (65.98\")   Wt 89.8 kg (198 lb)   SpO2 98%   BMI 31.97 kg/m²     Physical Exam   Constitutional: She is oriented to person, place, and time. She appears well-developed and well-nourished.   HENT:   Head: Normocephalic and atraumatic.   Eyes: EOM are normal.   Neck: Neck supple.   Cardiovascular: Normal rate and regular rhythm.   Pulmonary/Chest: Effort normal and breath sounds normal. No respiratory distress. She has no wheezes.   Musculoskeletal: She exhibits no edema.   Gait normal.   Neurological: She is alert and oriented to person, place, and time.   Skin: Skin is warm and dry.   Psychiatric: She has a normal mood and affect.   Vitals reviewed.          Assessment/Plan   Terra was seen today for follow-up and " shortness of breath.    Diagnoses and all orders for this visit:    SOB (shortness of breath)    Moderate persistent asthma without complication    Other allergic rhinitis    Other orders  -     VENTOLIN  (90 Base) MCG/ACT inhaler; Inhale 2 puffs Every 6 (Six) Hours As Needed for Wheezing.           Return in about 4 months (around 9/8/2019) for Recheck, For Dr. Palumbo.    DISCUSSION (if any):  Since she is not needing albuterol on a regular basis and she knows that she is not allergic to this medication I have asked her to continue using albuterol on an as-needed basis.    I have asked her to call the office if she begins to need the rescue inhaler more often or if she needs to use the nebulizer at all.    She is using Flonase and this has not been an issue.      Dictated utilizing Dragon dictation.    This document was electronically signed by JUAN CRALOS Berger May 8, 2019  2:30 PM

## 2019-09-09 ENCOUNTER — OFFICE VISIT (OUTPATIENT)
Dept: PULMONOLOGY | Facility: CLINIC | Age: 39
End: 2019-09-09

## 2019-09-09 VITALS
SYSTOLIC BLOOD PRESSURE: 120 MMHG | HEART RATE: 103 BPM | DIASTOLIC BLOOD PRESSURE: 88 MMHG | WEIGHT: 191 LBS | OXYGEN SATURATION: 99 % | RESPIRATION RATE: 15 BRPM | BODY MASS INDEX: 30.7 KG/M2 | HEIGHT: 66 IN

## 2019-09-09 DIAGNOSIS — R06.02 SOB (SHORTNESS OF BREATH): Primary | ICD-10-CM

## 2019-09-09 DIAGNOSIS — J30.89 OTHER ALLERGIC RHINITIS: ICD-10-CM

## 2019-09-09 DIAGNOSIS — J45.901 ACUTE EXACERBATION OF ASTHMA WITH ALLERGIC RHINITIS: ICD-10-CM

## 2019-09-09 PROCEDURE — 96372 THER/PROPH/DIAG INJ SC/IM: CPT | Performed by: NURSE PRACTITIONER

## 2019-09-09 PROCEDURE — 99214 OFFICE O/P EST MOD 30 MIN: CPT | Performed by: NURSE PRACTITIONER

## 2019-09-09 RX ORDER — MONTELUKAST SODIUM 10 MG/1
10 TABLET ORAL NIGHTLY
Qty: 30 TABLET | Refills: 5 | Status: SHIPPED | OUTPATIENT
Start: 2019-09-09

## 2019-09-09 RX ORDER — METHYLPREDNISOLONE ACETATE 80 MG/ML
80 INJECTION, SUSPENSION INTRA-ARTICULAR; INTRALESIONAL; INTRAMUSCULAR; SOFT TISSUE ONCE
Status: COMPLETED | OUTPATIENT
Start: 2019-09-09 | End: 2019-09-09

## 2019-09-09 RX ADMIN — METHYLPREDNISOLONE ACETATE 80 MG: 80 INJECTION, SUSPENSION INTRA-ARTICULAR; INTRALESIONAL; INTRAMUSCULAR; SOFT TISSUE at 12:24

## 2019-09-09 NOTE — PROGRESS NOTES
Subjective   Terra Hall is a 39 y.o. female.     History of Present Illness     {Common H&P Review Areas:24864}    Review of Systems    Objective   Physical Exam    Assessment/Plan   {Assess/PlanSmartLinks:71927}

## 2019-09-09 NOTE — PROGRESS NOTES
"Chief Complaint   Patient presents with   • Follow-up   • Shortness of Breath         Subjective   Terra Hall is a 39 y.o. female.     History of Present Illness   The patient comes in today for follow-up of asthma and allergic rhinitis.    The patient  reports symptoms of an upper respiratory infection for a couple of weeks now including shortness of breath, cough, chest tightness, and sinus pressure.    She has used her rescue inhaler on occasion since she has been sick but not but a couple of times prior to that.  She was wheezing and felt chest tightness and has used the nebulizer once since she has been sick.    She also has Qvar and since her last visit restarted using it on occasion when she feels like she needs it.  She is using Flonase on a daily basis and feels like it helps.    The following portions of the patient's history were reviewed and updated as appropriate: allergies, current medications, past family history, past medical history, past social history and past surgical history.    Review of Systems   HENT: Positive for sinus pressure and sneezing. Negative for sore throat.    Respiratory: Positive for cough, chest tightness and shortness of breath. Negative for wheezing.        Objective   Visit Vitals  /88   Pulse 103   Resp 15   Ht 167.6 cm (66\")   Wt 86.6 kg (191 lb)   SpO2 99%   BMI 30.83 kg/m²     Physical Exam   Constitutional: She is oriented to person, place, and time. She appears well-developed and well-nourished.   HENT:   Head: Atraumatic.   Mouth/Throat: Oropharynx is clear and moist.   Eyes: EOM are normal.   Neck: Neck supple.   Cardiovascular: Normal rate and regular rhythm.   Pulmonary/Chest: Effort normal and breath sounds normal. No respiratory distress.   Only a few wheezes.   Musculoskeletal: She exhibits no edema.   Neurological: She is alert and oriented to person, place, and time.   Skin: Skin is warm and dry.   Psychiatric: She has a normal mood and affect. "   Vitals reviewed.          Assessment/Plan   Terra was seen today for follow-up and shortness of breath.    Diagnoses and all orders for this visit:    SOB (shortness of breath)  -     Peak Flow    Other allergic rhinitis    Acute exacerbation of asthma with allergic rhinitis  -     methylPREDNISolone acetate (DEPO-medrol) injection 80 mg    Other orders  -     beclomethasone (QVAR) 40 MCG/ACT inhaler; Inhale 2 puffs 2 (Two) Times a Day. Rinse mouth with water after use.  -     montelukast (SINGULAIR) 10 MG tablet; Take 1 tablet by mouth Every Night.           Return in about 5 months (around 2/9/2020) for Recheck, For Dr. Palumbo.    DISCUSSION (if any):  Her peak flow today is 350 L/min.    She has been dealing with an asthma exacerbation for about 2 weeks.  .  Her symptoms have significantly decreased with the use of over-the-counter medications and her inhalers.  She continues to have a significant cough which is nonproductive.    I have asked her to use Qvar 40 mcg 2 puffs twice a day every day.  She is not sure if her insurance will cover this because she is switched to [facility].  She states she has done better on this inhaler than any of the other she has tried.  She had a reaction to several previous inhalers.  I have given her a sample of Qvar 80 mcg today because we did not have samples of the 40 mcg.  I have asked her to use 2 puffs once a day rather than twice a day.  She will let us know if this medication is not covered by her new insurance.    She tolerates albuterol and I have asked her to continue using it as needed.    Since she has had an exacerbation I have also recommended she restart Singulair.  She is willing to try this medication again.  She had an allergic reaction several months ago and stopped all of her medication because there was no way of telling which medication it could have been.  She was seen through the emergency room and treated.  She has added medications back one at a time  and has not resumed many of her previous medications.    For the symptoms of acute exacerbation of asthma, I have prescribed intramuscular steroids.    Patient will be prescribed antibiotics, if the patient develops any fever.    Side effects have been discussed in detail    Patient was asked to report to the emergency room if symptoms do not improve.      Dictated utilizing Dragon dictation.    This document was electronically signed by JUAN CARLOS Berger September 9, 2019  12:15 PM

## 2020-06-29 RX ORDER — ALBUTEROL SULFATE 90 UG/1
2 AEROSOL, METERED RESPIRATORY (INHALATION) EVERY 6 HOURS PRN
Qty: 1 INHALER | Refills: 3 | Status: SHIPPED | OUTPATIENT
Start: 2020-06-29

## 2020-10-23 RX ORDER — BECLOMETHASONE DIPROPIONATE HFA 40 UG/1
AEROSOL, METERED RESPIRATORY (INHALATION)
Qty: 11 G | Refills: 0 | OUTPATIENT
Start: 2020-10-23

## 2020-11-19 ENCOUNTER — OFFICE VISIT (OUTPATIENT)
Dept: OBSTETRICS AND GYNECOLOGY | Facility: CLINIC | Age: 40
End: 2020-11-19

## 2020-11-19 VITALS
SYSTOLIC BLOOD PRESSURE: 140 MMHG | HEIGHT: 66 IN | WEIGHT: 181.4 LBS | DIASTOLIC BLOOD PRESSURE: 80 MMHG | BODY MASS INDEX: 29.15 KG/M2

## 2020-11-19 DIAGNOSIS — R10.31 RIGHT LOWER QUADRANT PAIN: Primary | ICD-10-CM

## 2020-11-19 PROCEDURE — 99214 OFFICE O/P EST MOD 30 MIN: CPT | Performed by: PHYSICIAN ASSISTANT

## 2020-11-19 NOTE — PROGRESS NOTES
Subjective   Chief Complaint   Patient presents with   • Follow-up     Patient complains of Right lower quadrant pain.       Terra Hall is a 40 y.o. year old  presenting to be seen for complaint of right lower quadrant pain.  She reports that she has been experiencing some pressure type pain off and on for 2 months in the right lower quadrant.  She had a KUB last Friday at [facility] and has a kidney stone.  She reports that when the KUB was done she was told that she had an ovarian cyst as well.  Patient has Mirena IUD which was placed 2018.  She has had periods of amenorrhea with the IUD however for the last 2 months she reports she has had some random off and on light bleeding and spotting.  The bleeding has been bright red in nature.  She has not had any vaginal discharge.  No dysuria.  No change in bowel habits.  No fever or chills.  No nausea or vomiting.  Transvaginal ultrasound done in the office today reveals a normal-appearing anteverted uterus with IUD in place.  Her endometrium is 9 mm.  The right ovary has small follicles.  The left ovary is normal.  There is no free fluid or adnexal masses.  Ultrasound images are reviewed by myself.  A copy of the patient's KUB is also obtained while she is in the office and this has no mention of ovarian cyst.  She does have a left kidney stone in the upper pole of the left kidney.    Past Medical History:   Diagnosis Date   • Asthma    • Kidney stones    • Migraine    • Seasonal allergies         Current Outpatient Medications:   •  beclomethasone (QVAR) 40 MCG/ACT inhaler, Inhale 2 puffs 2 (Two) Times a Day. Rinse mouth with water after use., Disp: 1 inhaler, Rfl: 5  •  cetirizine (zyrTEC) 10 MG tablet, Take 10 mg by mouth Daily., Disp: , Rfl:   •  magnesium gluconate (MAGONATE) 500 MG tablet, Take 500 mg by mouth Daily., Disp: , Rfl:   •  montelukast (SINGULAIR) 10 MG tablet, Take 1 tablet by mouth Every Night., Disp: 30 tablet, Rfl:  5  •  ondansetron (ZOFRAN) 8 MG tablet, , Disp: , Rfl:   •  Potassium (POTASSIMIN PO), Take  by mouth., Disp: , Rfl:   •  tamsulosin (FLOMAX) 0.4 MG capsule 24 hr capsule, , Disp: , Rfl:   •  VENTOLIN  (90 Base) MCG/ACT inhaler, Inhale 2 puffs Every 6 (Six) Hours As Needed for Wheezing., Disp: 1 inhaler, Rfl: 3  •  levonorgestrel (MIRENA, 52 MG,) 20 MCG/24HR IUD, Take to Doctor's office as directed, Disp: 1 each, Rfl: 0  •  NON FORMULARY, quora - vitamins to flush kidneys, Disp: , Rfl:    Allergies   Allergen Reactions   • Macrobid [Nitrofurantoin Macrocrystal] Anaphylaxis   • Bactrim [Sulfamethoxazole-Trimethoprim]    • Divalproex Sodium [Valproic Acid] Swelling   • Myrbetriq [Mirabegron] Swelling   • Spironolactone Swelling   • Sumatriptan    • Symbicort [Budesonide-Formoterol Fumarate] Other (See Comments)     Tachycardia       Past Surgical History:   Procedure Laterality Date   • ADENOIDECTOMY     • BREAST BIOPSY Left    • CHOLECYSTECTOMY     • DIAGNOSTIC LAPAROSCOPY     • NEPHROSTOMY     • TONSILLECTOMY        Social History     Socioeconomic History   • Marital status:      Spouse name: Not on file   • Number of children: Not on file   • Years of education: Not on file   • Highest education level: Not on file   Social Needs   • Financial resource strain: Not hard at all   • Food insecurity     Worry: Never true     Inability: Never true   • Transportation needs     Medical: No     Non-medical: No   Tobacco Use   • Smoking status: Never Smoker   • Smokeless tobacco: Never Used   Substance and Sexual Activity   • Alcohol use: No   • Drug use: No   • Sexual activity: Yes     Partners: Male     Birth control/protection: I.U.D.   Lifestyle   • Physical activity     Days per week: 0 days     Minutes per session: 0 min   • Stress: Only a little      Family History   Problem Relation Age of Onset   • Asthma Mother    • Heart failure Mother    • Cancer Mother    • Osteoarthritis Mother    • Hypertension  "Mother    • Breast cancer Mother 37        DX AGE 52 2ND TIME; THREE DIFFERENT TYPES OF BREAST CANCER; NEG FOR BRCA 1 & BRCA 2   • Lung disease Father    • Arthritis Father    • Hypertension Father    • Osteoporosis Paternal Grandmother    • Osteoporosis Maternal Grandmother    • Diabetes Maternal Grandmother    • Ovarian cancer Neg Hx    • Endometrial cancer Neg Hx        Review of Systems   Constitutional: Negative for chills, diaphoresis and fever.   Gastrointestinal: Negative for constipation, diarrhea, nausea and vomiting.   Genitourinary: Positive for flank pain, pelvic pain and vaginal bleeding. Negative for difficulty urinating, dysuria, hematuria, menstrual problem and vaginal discharge.           Objective   /80   Ht 167.6 cm (66\")   Wt 82.3 kg (181 lb 6.4 oz)   Breastfeeding No   BMI 29.28 kg/m²     Physical Exam  Constitutional:       Appearance: Normal appearance. She is well-developed and well-groomed.   Eyes:      General: Lids are normal.      Extraocular Movements: Extraocular movements intact.      Conjunctiva/sclera: Conjunctivae normal.   Abdominal:      Palpations: Abdomen is soft. There is no mass.      Tenderness: There is no abdominal tenderness. There is no right CVA tenderness, left CVA tenderness or guarding.   Genitourinary:     Labia:         Right: No rash, tenderness or lesion.         Left: No rash, tenderness or lesion.       Urethra: No prolapse, urethral pain, urethral swelling or urethral lesion.      Vagina: No vaginal discharge, erythema, tenderness, bleeding or lesions.      Cervix: No cervical motion tenderness, discharge, friability, lesion, erythema, cervical bleeding or eversion.      Uterus: Not enlarged and not tender.       Adnexa:         Right: No mass or tenderness.          Left: No mass or tenderness.     Musculoskeletal: Normal range of motion.   Skin:     General: Skin is warm and dry.      Findings: No lesion or rash.   Neurological:      Mental Status: " She is alert and oriented to person, place, and time.   Psychiatric:         Attention and Perception: Attention normal.         Mood and Affect: Mood normal.         Speech: Speech normal.         Behavior: Behavior normal.         Thought Content: Thought content normal.              Assessment and Plan  Diagnoses and all orders for this visit:    1. Right lower quadrant pain (Primary)  -     Cancel: US Non-ob Transvaginal      Patient Instructions   The patient is reassured there are no abnormal findings on her pelvic ultrasound today and no ovarian cyst is noted.  Her IUD is in proper position.  She is counseled that there can be unpredictable bleeding and spotting with the IUD even though she has had episodes of amenorrhea.  She is encouraged to follow-up for annual exam and Pap smear.             This note was electronically signed.    Lorena Wilkes PA-C   November 21, 2020

## 2021-05-14 RX ORDER — ALBUTEROL SULFATE 90 UG/1
AEROSOL, METERED RESPIRATORY (INHALATION)
Qty: 18 G | Refills: 0 | OUTPATIENT
Start: 2021-05-14

## 2022-01-13 ENCOUNTER — LAB (OUTPATIENT)
Dept: LAB | Facility: HOSPITAL | Age: 42
End: 2022-01-13

## 2022-01-13 DIAGNOSIS — Z03.818 ENCOUNTER FOR PATIENT CONCERN ABOUT EXPOSURE TO INFECTIOUS ORGANISM: Primary | ICD-10-CM

## 2022-01-13 LAB — SARS-COV-2 RNA NOSE QL NAA+PROBE: NOT DETECTED

## 2022-01-13 PROCEDURE — U0004 COV-19 TEST NON-CDC HGH THRU: HCPCS

## 2022-01-14 ENCOUNTER — TELEPHONE (OUTPATIENT)
Dept: OTHER | Facility: HOSPITAL | Age: 42
End: 2022-01-14

## 2023-04-20 ENCOUNTER — OFFICE VISIT (OUTPATIENT)
Dept: OBSTETRICS AND GYNECOLOGY | Facility: CLINIC | Age: 43
End: 2023-04-20
Payer: COMMERCIAL

## 2023-04-20 VITALS
HEIGHT: 66 IN | DIASTOLIC BLOOD PRESSURE: 80 MMHG | WEIGHT: 198.2 LBS | SYSTOLIC BLOOD PRESSURE: 112 MMHG | BODY MASS INDEX: 31.85 KG/M2

## 2023-04-20 DIAGNOSIS — N89.8 VAGINAL IRRITATION: ICD-10-CM

## 2023-04-20 DIAGNOSIS — R10.2 VAGINAL PAIN: Primary | ICD-10-CM

## 2023-04-20 DIAGNOSIS — N81.6 CYSTOCELE WITH RECTOCELE: ICD-10-CM

## 2023-04-20 DIAGNOSIS — N81.10 CYSTOCELE WITH RECTOCELE: ICD-10-CM

## 2023-04-20 DIAGNOSIS — Z30.431 IUD CHECK UP: Primary | ICD-10-CM

## 2023-04-20 PROCEDURE — 99212 OFFICE O/P EST SF 10 MIN: CPT | Performed by: PHYSICIAN ASSISTANT

## 2023-04-20 RX ORDER — LISINOPRIL 10 MG/1
1 TABLET ORAL DAILY
COMMUNITY
Start: 2023-03-31

## 2023-04-20 RX ORDER — POTASSIUM CITRATE 10 MEQ/1
10 TABLET, EXTENDED RELEASE ORAL
COMMUNITY
Start: 2022-12-27

## 2023-04-20 NOTE — PATIENT INSTRUCTIONS
VG plus swab done and will contact with results when available  Advised of findings of cystocele and recommend starting Kegel exercises daily and avoid strenuous lifting  Keep appt in June for annual/pap

## 2023-04-20 NOTE — PROGRESS NOTES
Subjective   Chief Complaint   Patient presents with   • Menstrual Problem     Patient states she typically doesn't have a period with IUD and has bleeding bleeding recently and vaginal swelling       Terra Hall is a 42 y.o. year old  presenting to be seen for vaginal bleeding.  Patient has Mirena IUD that was inserted 2018.  Reports she had become amenorrheic with the Mirena until the last few months she has had some random bleeding that has occurred every other month.  Bleeding is not heavy and will last a few days at a time.  She occasionally has just noted a pink discharge.  She thought she had a yeast infection and had tried to use some vaginal cream but was irritated with the applicator and had trouble inserting the applicator.  States vaginal opening felt swollen.    Past Medical History:   Diagnosis Date   • Asthma    • Kidney stones    • Migraine    • Seasonal allergies         Current Outpatient Medications:   •  beclomethasone (QVAR) 40 MCG/ACT inhaler, Inhale 2 puffs 2 (Two) Times a Day. Rinse mouth with water after use., Disp: 1 inhaler, Rfl: 5  •  cetirizine (zyrTEC) 10 MG tablet, Take 1 tablet by mouth Daily., Disp: , Rfl:   •  lisinopril (PRINIVIL,ZESTRIL) 10 MG tablet, Take 1 tablet by mouth Daily., Disp: , Rfl:   •  magnesium gluconate (MAGONATE) 500 MG tablet, Take 500 mg by mouth Daily., Disp: , Rfl:   •  ondansetron (ZOFRAN) 8 MG tablet, , Disp: , Rfl:   •  potassium citrate (UROCIT-K) 10 MEQ (1080 MG) CR tablet, Take 1 tablet by mouth., Disp: , Rfl:   •  tamsulosin (FLOMAX) 0.4 MG capsule 24 hr capsule, , Disp: , Rfl:   •  VENTOLIN  (90 Base) MCG/ACT inhaler, Inhale 2 puffs Every 6 (Six) Hours As Needed for Wheezing., Disp: 1 inhaler, Rfl: 3  •  levonorgestrel (MIRENA, 52 MG,) 20 MCG/24HR IUD, Take to Doctor's office as directed, Disp: 1 each, Rfl: 0   Allergies   Allergen Reactions   • Budesonide Shortness Of Breath   • Formoterol Shortness Of Breath   •  "Macrobid [Nitrofurantoin Macrocrystal] Anaphylaxis   • Mirabegron Swelling   • Spironolactone Swelling   • Sulfamethoxazole-Trimethoprim Shortness Of Breath and Unknown (See Comments)   • Sumatriptan Shortness Of Breath and Unknown (See Comments)   • Trimethoprim Shortness Of Breath   • Valproic Acid Swelling   • Budesonide-Formoterol Fumarate Other (See Comments) and Unknown (See Comments)     Tachycardia   Tachycardia    • Sulfamethoxazole Other (See Comments)     Other reaction(s): Other  Other reaction(s): Other        Past Surgical History:   Procedure Laterality Date   • ADENOIDECTOMY     • BREAST BIOPSY Left    • CHOLECYSTECTOMY     • DIAGNOSTIC LAPAROSCOPY     • NEPHROSTOMY     • TONSILLECTOMY        Social History     Socioeconomic History   • Marital status:    Tobacco Use   • Smoking status: Never   • Smokeless tobacco: Never   Vaping Use   • Vaping Use: Never used   Substance and Sexual Activity   • Alcohol use: No   • Drug use: No   • Sexual activity: Yes     Partners: Male     Birth control/protection: I.U.D.      Family History   Problem Relation Age of Onset   • Asthma Mother    • Heart failure Mother    • Cancer Mother    • Osteoarthritis Mother    • Hypertension Mother    • Breast cancer Mother 37        DX AGE 52 2ND TIME; THREE DIFFERENT TYPES OF BREAST CANCER; NEG FOR BRCA 1 & BRCA 2   • Lung disease Father    • Arthritis Father    • Hypertension Father    • Osteoporosis Paternal Grandmother    • Osteoporosis Maternal Grandmother    • Diabetes Maternal Grandmother    • Ovarian cancer Neg Hx    • Endometrial cancer Neg Hx        Review of Systems   Constitutional: Negative for chills, diaphoresis and fever.   Gastrointestinal: Negative for constipation, diarrhea, nausea and vomiting.   Genitourinary: Positive for vaginal bleeding and vaginal pain. Negative for difficulty urinating, dysuria and pelvic pain.           Objective   /80   Ht 167.6 cm (66\")   Wt 89.9 kg (198 lb 3.2 oz)  "  BMI 31.99 kg/m²     Physical Exam  Constitutional:       Appearance: Normal appearance. She is well-developed and well-groomed.   Eyes:      General: Lids are normal.      Extraocular Movements: Extraocular movements intact.      Conjunctiva/sclera: Conjunctivae normal.   Genitourinary:     Labia:         Right: No rash, tenderness or lesion.         Left: No rash, tenderness or lesion.       Urethra: No prolapse, urethral pain, urethral swelling or urethral lesion.      Vagina: No signs of injury. No erythema, tenderness, bleeding or lesions.      Cervix: No cervical motion tenderness, discharge, friability or lesion.      Uterus: Not enlarged and not tender.       Adnexa:         Right: No mass or tenderness.          Left: No mass or tenderness.        Comments: IUD strings 2 cm  Small amount creamy white discharge  Grade 2 cystocele  Grade 1 rectocele  Neurological:      General: No focal deficit present.      Mental Status: She is alert and oriented to person, place, and time.   Psychiatric:         Attention and Perception: Attention normal.         Mood and Affect: Mood normal.         Speech: Speech normal.         Behavior: Behavior is cooperative.            Result Review :                   Assessment and Plan  Diagnoses and all orders for this visit:    1. IUD check up (Primary)    2. Vaginal irritation  -     NuSwab VG+ - Swab, Vagina    3. Cystocele with rectocele      Patient Instructions   VG plus swab done and will contact with results when available  Advised of findings of cystocele and recommend starting Kegel exercises daily and avoid strenuous lifting  Keep appt in June for annual/pap             This note was electronically signed.    Lorena Wilkes PA-C   April 20, 2023

## 2023-04-22 LAB
A VAGINAE DNA VAG QL NAA+PROBE: NORMAL SCORE
BVAB2 DNA VAG QL NAA+PROBE: NORMAL SCORE
C ALBICANS DNA VAG QL NAA+PROBE: NEGATIVE
C GLABRATA DNA VAG QL NAA+PROBE: NEGATIVE
C TRACH DNA VAG QL NAA+PROBE: NEGATIVE
MEGA1 DNA VAG QL NAA+PROBE: NORMAL SCORE
N GONORRHOEA DNA VAG QL NAA+PROBE: NEGATIVE
T VAGINALIS DNA VAG QL NAA+PROBE: NEGATIVE

## 2023-08-28 ENCOUNTER — OFFICE VISIT (OUTPATIENT)
Dept: OBSTETRICS AND GYNECOLOGY | Facility: CLINIC | Age: 43
End: 2023-08-28
Payer: COMMERCIAL

## 2023-08-28 VITALS
WEIGHT: 194.4 LBS | DIASTOLIC BLOOD PRESSURE: 80 MMHG | SYSTOLIC BLOOD PRESSURE: 110 MMHG | BODY MASS INDEX: 31.24 KG/M2 | HEIGHT: 66 IN

## 2023-08-28 DIAGNOSIS — Z12.4 SCREENING FOR CERVICAL CANCER: ICD-10-CM

## 2023-08-28 DIAGNOSIS — Z01.419 ROUTINE GYNECOLOGICAL EXAMINATION: Primary | ICD-10-CM

## 2023-08-28 DIAGNOSIS — Z30.431 ENCOUNTER FOR MANAGEMENT OF INTRAUTERINE CONTRACEPTIVE DEVICE (IUD), UNSPECIFIED IUD MANAGEMENT TYPE: ICD-10-CM

## 2023-08-28 RX ORDER — PANTOPRAZOLE SODIUM 40 MG/1
1 TABLET, DELAYED RELEASE ORAL EVERY MORNING
COMMUNITY
Start: 2023-07-27

## 2023-08-28 NOTE — PATIENT INSTRUCTIONS
Self breast exam monthly  Regular exercise    Schedule IUD change out once new Mirena arrives in office

## 2023-08-28 NOTE — PROGRESS NOTES
Subjective   Chief Complaint   Patient presents with    Gynecologic Exam     Pap is due, MMG done 23-WNL, No complaints       Terra Hall is a 42 y.o. year old  presenting to be seen for her annual gynecological exam.   No complaints or concerns  Has Mirena IUD due for removal in October. Would like to get new Mirena inserted  No bleeds with Mirena  Had normal mammogram in     Past Medical History:   Diagnosis Date    Abnormal Pap smear of cervix     Asthma     Cervical dysplasia     Diabetes mellitus     Hypoglycemia ER treatment 2 times    Disease of thyroid gland     Just 10mm TR 4 nodule    Hypertension     Lisinopril    Kidney stones     Migraine     Osteoarthritis     Ovarian cyst     Polycystic ovary syndrome     When I was 14    PONV (postoperative nausea and vomiting)     Fine if anesthesia treats prior    Seasonal allergies     Varicella When i was 7        Current Outpatient Medications:     beclomethasone (QVAR) 40 MCG/ACT inhaler, Inhale 2 puffs 2 (Two) Times a Day. Rinse mouth with water after use., Disp: 1 inhaler, Rfl: 5    cetirizine (zyrTEC) 10 MG tablet, Take 1 tablet by mouth Daily., Disp: , Rfl:     lisinopril (PRINIVIL,ZESTRIL) 10 MG tablet, Take 1 tablet by mouth Daily., Disp: , Rfl:     magnesium gluconate (MAGONATE) 500 MG tablet, Take 500 mg by mouth Daily., Disp: , Rfl:     metoprolol tartrate (LOPRESSOR) 25 MG tablet, TAKE 1/2 (ONE-HALF) TO 1 TABLET BY MOUTH TWICE DAILY AS NEEDED FOR PALPITATIONS, Disp: , Rfl:     ondansetron (ZOFRAN) 8 MG tablet, , Disp: , Rfl:     pantoprazole (PROTONIX) 40 MG EC tablet, Take 1 tablet by mouth Every Morning., Disp: , Rfl:     potassium citrate (UROCIT-K) 10 MEQ (1080 MG) CR tablet, Take 1 tablet by mouth., Disp: , Rfl:     tamsulosin (FLOMAX) 0.4 MG capsule 24 hr capsule, , Disp: , Rfl:     VENTOLIN  (90 Base) MCG/ACT inhaler, Inhale 2 puffs Every 6 (Six) Hours As Needed for Wheezing., Disp: 1 inhaler, Rfl: 3     Levonorgestrel (MIRENA) 20 MCG/DAY intrauterine device IUD, 1 each by Intrauterine route 1 (One) Time for 1 dose., Disp: 1 each, Rfl: 0    levonorgestrel (MIRENA, 52 MG,) 20 MCG/24HR IUD, Take to Doctor's office as directed, Disp: 1 each, Rfl: 0   Allergies   Allergen Reactions    Budesonide Shortness Of Breath    Formoterol Shortness Of Breath    Macrobid [Nitrofurantoin Macrocrystal] Anaphylaxis    Mirabegron Swelling    Spironolactone Swelling    Sulfamethoxazole-Trimethoprim Shortness Of Breath and Unknown (See Comments)    Sumatriptan Shortness Of Breath and Unknown (See Comments)    Trimethoprim Shortness Of Breath    Valproic Acid Swelling    Budesonide-Formoterol Fumarate Other (See Comments) and Unknown (See Comments)     Tachycardia   Tachycardia     Sulfamethoxazole Other (See Comments)     Other reaction(s): Other  Other reaction(s): Other        Past Surgical History:   Procedure Laterality Date    ADENOIDECTOMY      BREAST BIOPSY Left     CHOLECYSTECTOMY      DIAGNOSTIC LAPAROSCOPY      LAPAROSCOPIC CHOLECYSTECTOMY      NEPHROSTOMY      PELVIC LAPAROSCOPY      TONSILLECTOMY      WISDOM TOOTH EXTRACTION        Social History     Socioeconomic History    Marital status:    Tobacco Use    Smoking status: Never    Smokeless tobacco: Never   Vaping Use    Vaping Use: Never used   Substance and Sexual Activity    Alcohol use: No    Drug use: No    Sexual activity: Yes     Partners: Male     Birth control/protection: I.U.D.      Family History   Problem Relation Age of Onset    Asthma Mother     Heart failure Mother     Cancer Mother     Osteoarthritis Mother     Hypertension Mother     Breast cancer Mother 37        DX AGE 52 2ND TIME; THREE DIFFERENT TYPES OF BREAST CANCER; NEG FOR BRCA 1 & BRCA 2    Lung disease Father     Arthritis Father     Hypertension Father     Osteoporosis Paternal Grandmother     Osteoporosis Maternal Grandmother     Diabetes Maternal Grandmother     Ovarian cancer Neg Hx   "   Endometrial cancer Neg Hx        Review of Systems   Constitutional:  Negative for chills, diaphoresis and fever.   Gastrointestinal:  Negative for constipation, diarrhea, nausea and vomiting.   Genitourinary:  Negative for difficulty urinating, dysuria, menstrual problem and pelvic pain.         Objective   /80   Ht 167.6 cm (66\")   Wt 88.2 kg (194 lb 6.4 oz)   BMI 31.38 kg/mý     Physical Exam  Exam conducted with a chaperone present.   Constitutional:       Appearance: Normal appearance. She is well-developed and well-groomed.   Eyes:      General: Lids are normal.      Extraocular Movements: Extraocular movements intact.      Conjunctiva/sclera: Conjunctivae normal.   Neck:      Thyroid: No thyroid mass.   Chest:   Breasts:     Breasts are symmetrical.      Right: No inverted nipple, mass, nipple discharge, skin change or tenderness.      Left: No inverted nipple, mass, nipple discharge, skin change or tenderness.   Abdominal:      General: There is no distension.      Palpations: Abdomen is soft. There is no hepatomegaly or splenomegaly.      Tenderness: There is no abdominal tenderness.   Genitourinary:     Exam position: Lithotomy position.      Labia:         Right: No rash, tenderness or lesion.         Left: No rash, tenderness or lesion.       Urethra: No prolapse, urethral pain, urethral swelling or urethral lesion.      Vagina: No vaginal discharge, tenderness or lesions.      Cervix: No cervical motion tenderness, discharge, friability or lesion.      Uterus: Not enlarged and not tender.       Adnexa:         Right: No mass or tenderness.          Left: No mass or tenderness.        Comments: IUD strings 3 cm  Musculoskeletal:      Cervical back: Neck supple.   Lymphadenopathy:      Upper Body:      Right upper body: No axillary adenopathy.      Left upper body: No axillary adenopathy.   Skin:     General: Skin is warm and dry.      Findings: No lesion.   Neurological:      General: No " focal deficit present.      Mental Status: She is alert and oriented to person, place, and time.   Psychiatric:         Attention and Perception: Attention normal.         Mood and Affect: Mood normal.         Speech: Speech normal.         Behavior: Behavior normal.         Thought Content: Thought content normal.          Result Review :                   Assessment and Plan  Diagnoses and all orders for this visit:    1. Routine gynecological examination (Primary)    2. Screening for cervical cancer  -     LIQUID-BASED PAP SMEAR WITH HPV GENOTYPING REGARDLESS OF INTERPRETATION ([facility])    3. Encounter for management of intrauterine contraceptive device (IUD), unspecified IUD management type    Other orders  -     Discontinue: Levonorgestrel (MIRENA) 20 MCG/DAY intrauterine device IUD; 1 each by Intrauterine route 1 (One) Time for 1 dose.  Dispense: 1 each; Refill: 0  -     Levonorgestrel (MIRENA) 20 MCG/DAY intrauterine device IUD; 1 each by Intrauterine route 1 (One) Time for 1 dose.  Dispense: 1 each; Refill: 0      Patient Instructions   Self breast exam monthly  Regular exercise    Schedule IUD change out once new Mirena arrives in office           This note was electronically signed.    Lorena Wilkes PA-C   August 28, 2023

## 2023-08-29 LAB — REF LAB TEST METHOD: NORMAL
